# Patient Record
Sex: FEMALE
[De-identification: names, ages, dates, MRNs, and addresses within clinical notes are randomized per-mention and may not be internally consistent; named-entity substitution may affect disease eponyms.]

---

## 2019-08-10 ENCOUNTER — HOSPITAL ENCOUNTER (EMERGENCY)
Dept: HOSPITAL 56 - MW.ED | Age: 54
LOS: 1 days | Discharge: HOME | End: 2019-08-11
Payer: MEDICAID

## 2019-08-10 DIAGNOSIS — Z88.8: ICD-10-CM

## 2019-08-10 DIAGNOSIS — I10: ICD-10-CM

## 2019-08-10 DIAGNOSIS — M25.462: Primary | ICD-10-CM

## 2019-08-10 DIAGNOSIS — M25.461: ICD-10-CM

## 2019-08-10 LAB
BUN SERPL-MCNC: 18 MG/DL (ref 7–18)
CHLORIDE SERPL-SCNC: 108 MMOL/L (ref 98–107)
CO2 SERPL-SCNC: 26.5 MMOL/L (ref 21–32)
GLUCOSE SERPL-MCNC: 104 MG/DL (ref 74–106)
POTASSIUM SERPL-SCNC: 4.2 MMOL/L (ref 3.5–5.1)
SODIUM SERPL-SCNC: 144 MMOL/L (ref 136–145)

## 2019-08-10 NOTE — CR
INDICATION:



Trauma  



TECHNIQUE:



Three views left wrist 



COMPARISON:



None



FINDINGS:



Bones: Alignment is normal. No fractures or bone lesions.  



Joint spaces: Unremarkable.  



Soft tissues: Soft tissue edema adjacent to the ulna styloid process. 



IMPRESSION:



Soft tissue edema adjacent to the ulnar styloid process.



Dictated by Soham Lombardi MD @ 8/10/2019 10:54:49 PM



Dictated by: Soham Lombardi MD @ 08/10/2019 22:54:56



(Electronically Signed)
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
## 2019-08-10 NOTE — CR
INDICATION:



Shortness of breath



TECHNIQUE:



Chest 1 view. 



COMPARISON:



None



FINDINGS:



Cardiovascular and mediastinum: Heart size and vasculature are normal in 

caliber and appearance.  Mediastinum is within normal limits.  



Lungs and pleural space: Lungs are clear.  No sign of infiltrate. 6 

millimeter probable calcified granuloma right upper lobe. No sign of 

pleural effusion.  No pneumothorax.  



Bones and soft tissues: No significant findings.    



IMPRESSION:



No acute pulmonary or cardiac abnormalities. 



6 millimeter probable calcified granuloma right upper lobe.



Dictated by Soham Lombardi MD @ 8/10/2019 10:53:14 PM



Dictated by: Soham Lombardi MD @ 08/10/2019 22:53:21



(Electronically Signed)